# Patient Record
Sex: MALE | Race: BLACK OR AFRICAN AMERICAN | Employment: UNEMPLOYED | URBAN - METROPOLITAN AREA
[De-identification: names, ages, dates, MRNs, and addresses within clinical notes are randomized per-mention and may not be internally consistent; named-entity substitution may affect disease eponyms.]

---

## 2017-01-17 NOTE — TELEPHONE ENCOUNTER
I returned patient's call and confirmed refills are needed for spironolactone and estradiol.   ----- Message from Joie Piña sent at 1/17/2017  2:00 PM EST -----  Regarding: Patient medication  1/17/2017  2:01 PM    Adelso Britton! Just received a call from  Steven Yael [birth year]SE 1985 He said that he would like to talk to you about his prescription. Mr. Reji Chávez can be reached at [phone]. Thanks Marcel Garcia.

## 2017-01-18 RX ORDER — SPIRONOLACTONE 100 MG/1
100 TABLET, FILM COATED ORAL 2 TIMES DAILY
Qty: 60 TAB | Refills: 10 | Status: SHIPPED | OUTPATIENT
Start: 2017-01-18 | End: 2018-01-11 | Stop reason: SDUPTHER

## 2017-01-18 RX ORDER — ESTRADIOL 2 MG/1
2 TABLET ORAL 2 TIMES DAILY
Qty: 60 TAB | Refills: 10 | Status: SHIPPED | OUTPATIENT
Start: 2017-01-18 | End: 2017-12-19 | Stop reason: SDUPTHER

## 2017-05-15 ENCOUNTER — OFFICE VISIT (OUTPATIENT)
Dept: ENDOCRINOLOGY | Age: 32
End: 2017-05-15

## 2017-05-15 VITALS
WEIGHT: 274 LBS | HEIGHT: 66 IN | DIASTOLIC BLOOD PRESSURE: 75 MMHG | HEART RATE: 75 BPM | BODY MASS INDEX: 44.03 KG/M2 | SYSTOLIC BLOOD PRESSURE: 118 MMHG

## 2017-05-15 DIAGNOSIS — Z78.9 MALE-TO-FEMALE TRANSGENDER PERSON: Primary | ICD-10-CM

## 2017-05-15 NOTE — PROGRESS NOTES
History of Present Illness: Luis Taylor is a 27 y.o. transgender female (male to female) who presents for follow-up of transgender therapy. Initial visit with me was in 8/2015 and this was when hormone therapy was started. Current therapy:  Spironolactone 100mg mg BID (increased in 11/2015) and estradiol 2 mg BID (started 8/2015)     She continues to note changes:  - overall stable since last visit. Still noting the following:   Facial hair lightening up. Shaving less. Skin softer. Hair less curly. Has noted some shifting of body weight. Less from around middle and more towards hips/etc    Has been seeing psychiatrist for long time. Continues with quetiapine for mood/sleep, as well as clonazepam for same. BMI 43 -  Still avoiding sugared beverages. Just recently cut back on mayonaise. Started exercising more. Social  Lives with mother, uncle, two brothers. Mother is supportive of change      Past Medical History:   Diagnosis Date    Obesity      Current Outpatient Prescriptions   Medication Sig    estradiol (ESTRACE) 2 mg tablet Take 1 Tab by mouth two (2) times a day.  spironolactone (ALDACTONE) 100 mg tablet Take 1 Tab by mouth two (2) times a day.  clonazePAM (KLONOPIN) 0.5 mg tablet     QUEtiapine (SEROQUEL) 100 mg tablet     QUEtiapine (SEROQUEL) 50 mg tablet      No current facility-administered medications for this visit. No Known Allergies    Physical Examination:  Visit Vitals    /75    Pulse 75    Ht 5' 6\" (1.676 m)    Wt 274 lb (124.3 kg)    BMI 44.22 kg/m2   -   - General: pleasant, no distress, normal gait   HEENT: hearing intact, EOMI, clear sclera without icterus  - Cardiovascular: regular, normal rate   - Respiratory: normal effort  - Integumentary: no edema.  Skin does seem softer.   - Psychiatric: normal mood and affect    Data Reviewed:   Component      Latest Ref Rng & Units 10/3/2016 10/3/2016 10/3/2016           4:35 PM  4:35 PM  4:35 PM   Glucose 65 - 99 mg/dL 91     BUN      6 - 20 mg/dL 12     Creatinine      0.76 - 1.27 mg/dL 0.98     GFR est non-AA      >59 mL/min/1.73 102     GFR est AA      >59 mL/min/1.73 117     BUN/Creatinine ratio      8 - 19 12     Sodium      134 - 144 mmol/L 139     Potassium      3.5 - 5.2 mmol/L 4.2     Chloride      97 - 108 mmol/L 100     CO2      18 - 29 mmol/L 24     Calcium      8.7 - 10.2 mg/dL 9.1     Protein, total      6.0 - 8.5 g/dL 7.0     Albumin      3.5 - 5.5 g/dL 4.2     GLOBULIN, TOTAL      1.5 - 4.5 g/dL 2.8     A-G Ratio      1.1 - 2.5 1.5     Bilirubin, total      0.0 - 1.2 mg/dL 0.3     Alk. phosphatase      39 - 117 IU/L 30 (L)     AST      0 - 40 IU/L 20     ALT      0 - 44 IU/L 14     Testosterone      348 - 1197 ng/dL   18 (L)   Comment         Comment   Free testosterone (Direct)      8.7 - 25.1 pg/mL   2.1 (L)   Estradiol      7.6 - 42.6 pg/mL  152.9 (H)        Assessment/Plan:   1. 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 Of note, did not take AM dose of estradiol today. Last dose was likely around 1-2 am - about 12 hours ago. 2. Hormonal imbalance in transgender patient - as above   3. BMI 40.0-44.9, adult [facility]) - encouraged dietary efforts     Patient Instructions   Male to female therapy. Continue spironolactone  100 mg twice daily    Continue estradiol 2 mg twice daily. - reassess labs today        Follow-up Disposition:  Return in about 6 months (around 11/15/2017).     Copy sent to:

## 2017-05-15 NOTE — MR AVS SNAPSHOT
Visit Information Date & Time Provider Department Dept. Phone Encounter #  
 5/15/2017  3:10 PM Nneka Bernal, [address] [place] [address] Diabetes and Endocrinology [phone] Follow-up Instructions Return in about 6 months (around 11/15/2017). Upcoming Health Maintenance Date Due DTaP/Tdap/Td series (1 - Tdap) 7/11/2005 INFLUENZA AGE 9 TO ADULT 8/1/2017 Allergies as of 5/15/2017  Review Complete On: 5/15/2017 By: Nneka Bernal MD  
 No Known Allergies Current Immunizations  Never Reviewed No immunizations on file. Not reviewed this visit You Were Diagnosed With   
  
 Codes Comments Male-to-female transgender person    -  Primary ICD-10-CM: F64.0 ICD-9-CM: 302.85 Hormonal imbalance in transgender patient     ICD-10-CM: E34.9, F64.0 ICD-9-CM: 259.9, 302.85   
 BMI 40.0-44.9, adult (HCC)     ICD-10-CM: Z68.41 
ICD-9-CM: V85.41 Vitals BP Pulse Height(growth percentile) Weight(growth percentile) BMI Smoking Status 118/75 75 5' 6\" (1.676 m) 274 lb (124.3 kg) 44.22 kg/m2 Never Smoker Vitals History BMI and BSA Data Body Mass Index Body Surface Area  
 44.22 kg/m 2 2.41 m 2 Preferred Pharmacy Pharmacy Name Phone [facility] [address] [facility] [phone] Your Updated Medication List  
  
   
This list is accurate as of: 5/15/17  4:14 PM.  Always use your most recent med list.  
  
  
  
  
 clonazePAM 0.5 mg tablet Commonly known as:  KlonoPIN  
  
 estradiol 2 mg tablet Commonly known as:  ESTRACE Take 1 Tab by mouth two (2) times a day. * QUEtiapine 100 mg tablet Commonly known as:  SEROquel * QUEtiapine 50 mg tablet Commonly known as:  SEROquel  
  
 spironolactone 100 mg tablet Commonly known as:  ALDACTONE Take 1 Tab by mouth two (2) times a day. * Notice:   This list has 2 medication(s) that are the same as other medications prescribed for you. Read the directions carefully, and ask your doctor or other care provider to review them with you. We Performed the Following ESTRADIOL P1928644 CPT(R)] METABOLIC PANEL, BASIC [56849 CPT(R)] TESTOSTERONE, WOMEN/CHILD [id number] CPT(R)] Follow-up Instructions Return in about 6 months (around 11/15/2017). Patient Instructions Male to female therapy. Continue spironolactone  100 mg twice daily Continue estradiol 2 mg twice daily. - reassess labs today Introducing [facility] SERVICES! Peg Hemp introduces [employer] patient portal. Now you can access parts of your medical record, email your doctor's office, and request medication refills online. 1. In your internet browser, go to [email or web address] 2. Click on the First Time User? Click Here link in the Sign In box. You will see the New Member Sign Up page. 3. Enter your [employer] Access Code exactly as it appears below. You will not need to use this code after youve completed the sign-up process. If you do not sign up before the expiration date, you must request a new code. · [employer] Access Code: [email or web address] Expires: 8/13/2017  4:14 PM 
 
4. Enter the last four digits of your Social Security Number (xxxx) and Date of Birth (mm/dd/yyyy) as indicated and click Submit. You will be taken to the next sign-up page. 5. Create a TrustEgg ID. This will be your [employer]Egg login ID and cannot be changed, so think of one that is secure and easy to remember. 6. Create a [employer]Egg password. You can change your password at any time. 7. Enter your Password Reset Question and Answer. This can be used at a later time if you forget your password. 8. Enter your e-mail address. You will receive e-mail notification when new information is available in [address] 9. Click Sign Up. You can now view and download portions of your medical record. 10. Click the Download Summary menu link to download a portable copy of your medical information. If you have questions, please visit the Frequently Asked Questions section of the [employer] website. Remember, [employer] is NOT to be used for urgent needs. For medical emergencies, dial 911. Now available from your iPhone and Android! Please provide this summary of care documentation to your next provider. Your primary care clinician is listed as Eloy Rudd. If you have any questions after today's visit, please call [phone].

## 2017-05-15 NOTE — PATIENT INSTRUCTIONS
Male to female therapy. Continue spironolactone  100 mg twice daily    Continue estradiol 2 mg twice daily.      - reassess labs today

## 2017-05-20 LAB
BUN SERPL-MCNC: 11 MG/DL (ref 6–20)
BUN/CREAT SERPL: 13 (ref 9–20)
CALCIUM SERPL-MCNC: 9.3 MG/DL (ref 8.7–10.2)
CHLORIDE SERPL-SCNC: 101 MMOL/L (ref 96–106)
CO2 SERPL-SCNC: 24 MMOL/L (ref 18–29)
CREAT SERPL-MCNC: 0.82 MG/DL (ref 0.76–1.27)
ESTRADIOL SERPL-MCNC: 155.1 PG/ML (ref 7.6–42.6)
GLUCOSE SERPL-MCNC: 88 MG/DL (ref 65–99)
POTASSIUM SERPL-SCNC: 4.8 MMOL/L (ref 3.5–5.2)
SODIUM SERPL-SCNC: 140 MMOL/L (ref 134–144)
TESTOST SERPL-MCNC: 12 NG/DL

## 2017-05-21 NOTE — PROGRESS NOTES
Labs are very stable and consistent. Continue estradiol 2 mg twice daily and spironolactone. Karla Rosa,  Please call and mail lab letter. Thank you.

## 2017-12-18 ENCOUNTER — OFFICE VISIT (OUTPATIENT)
Dept: ENDOCRINOLOGY | Age: 32
End: 2017-12-18

## 2017-12-18 VITALS
BODY MASS INDEX: 42.59 KG/M2 | HEART RATE: 75 BPM | DIASTOLIC BLOOD PRESSURE: 70 MMHG | SYSTOLIC BLOOD PRESSURE: 127 MMHG | HEIGHT: 66 IN | WEIGHT: 265 LBS

## 2017-12-18 DIAGNOSIS — Z78.9 MALE-TO-FEMALE TRANSGENDER PERSON: Primary | ICD-10-CM

## 2017-12-18 PROBLEM — E66.01 OBESITY, MORBID (HCC): Status: ACTIVE | Noted: 2017-12-18

## 2017-12-18 NOTE — MR AVS SNAPSHOT
Visit Information Date & Time Provider Department Dept. Phone Encounter #  
 12/18/2017  2:10 PM Baldomero Barrow, [address] [facility] Diabetes and Endocrinology [phone] [id number] Follow-up Instructions Return in about 6 months (around 6/18/2018). Upcoming Health Maintenance Date Due DTaP/Tdap/Td series (1 - Tdap) 7/11/2005 Influenza Age 5 to Adult 8/1/2017 Allergies as of 12/18/2017  Review Complete On: 5/15/2017 By: Baldomero Barrow MD  
 No Known Allergies Current Immunizations  Never Reviewed No immunizations on file. Not reviewed this visit You Were Diagnosed With   
  
 Codes Comments Male-to-female transgender person    -  Primary ICD-10-CM: F64.0 ICD-9-CM: 302.85   
 BMI 40.0-44.9, adult (HCC)     ICD-10-CM: Z68.41 
ICD-9-CM: V85.41 Vitals BP Pulse Height(growth percentile) Weight(growth percentile) BMI Smoking Status 127/70 75 5' 6\" (1.676 m) 265 lb (120.2 kg) 42.77 kg/m2 Never Smoker Vitals History BMI and BSA Data Body Mass Index Body Surface Area 42.77 kg/m 2 2.37 m 2 Preferred Pharmacy Pharmacy Name Phone [facility] [address] [place] [facility] [facility] [phone] Your Updated Medication List  
  
   
This list is accurate as of: 12/18/17  2:58 PM.  Always use your most recent med list.  
  
  
  
  
 clonazePAM 0.5 mg tablet Commonly known as:  KlonoPIN  
  
 estradiol 2 mg tablet Commonly known as:  ESTRACE Take 1 Tab by mouth two (2) times a day. QUEtiapine 100 mg tablet Commonly known as:  SEROquel  
  
 spironolactone 100 mg tablet Commonly known as:  ALDACTONE Take 1 Tab by mouth two (2) times a day. We Performed the Following ESTRADIOL [id number] CPT(R)] METABOLIC PANEL, COMPREHENSIVE [52127 CPT(R)] PROLACTIN [76552 CPT(R)] TESTOSTERONE, FREE & TOTAL [52929 CPT(R)] Follow-up Instructions Return in about 6 months (around 6/18/2018). Patient Instructions Male to female therapy. Continue spironolactone  100 mg twice daily Continue estradiol 2 mg twice daily. - reassess labs today Introducing [facility] SERVICES! [facility] introduces [employer] patient portal. Now you can access parts of your medical record, email your doctor's office, and request medication refills online. 1. In your internet browser, go to [email or web address] 2. Click on the First Time User? Click Here link in the Sign In box. You will see the New Member Sign Up page. 3. Enter your [employer] Access Code exactly as it appears below. You will not need to use this code after youve completed the sign-up process. If you do not sign up before the expiration date, you must request a new code. · [employer] Access Code: [email or web address] Expires: 3/18/2018  2:58 PM 
 
4. Enter the last four digits of your Social Security Number (xxxx) and Date of Birth (mm/dd/yyyy) as indicated and click Submit. You will be taken to the next sign-up page. 5. Create a [employer]s ID. This will be your [employer] login ID and cannot be changed, so think of one that is secure and easy to remember. 6. Create a [employer] password. You can change your password at any time. 7. Enter your Password Reset Question and Answer. This can be used at a later time if you forget your password. 8. Enter your e-mail address. You will receive e-mail notification when new information is available in [address] 9. Click Sign Up. You can now view and download portions of your medical record. 10. Click the Download Summary menu link to download a portable copy of your medical information. If you have questions, please visit the Frequently Asked Questions section of the [employer] website. Remember, [employer] is NOT to be used for urgent needs. For medical emergencies, dial 911. Now available from your iPhone and Android! Please provide this summary of care documentation to your next provider. Your primary care clinician is listed as Janay Bernabe. If you have any questions after today's visit, please call [phone].

## 2017-12-18 NOTE — PROGRESS NOTES
History of Present Illness: Allen Cox is a 34 y.o. transgender female presents for follow-up of transgender therapy. Initial visit with me was in 8/2015 and this was when hormone therapy was started. Still has not fully transitioned socially. Current therapy:  Spironolactone 100mg mg BID (increased in 11/2015) and estradiol 2 mg BID (started 8/2015)     She continues to note changes gradually. Facial hair growing more slowly. Shaving less. Skin softer. Has noted the redistribution of body weight    Has been seeing psychiatrist for long time. Continues with quetiapine for mood/sleep, as well as clonazepam for same. Quetiapine dose decreased from 150 mg to 100 mg since last visit. BMI 43 -  Weight down 9 lbs . Exercising still. Paying more attention to ingredients and nutrition labels  Having less candy. Social  Lives with mother, uncle, two brothers. Mother is supportive of change  Plans to change name to Astrid. Past Medical History:   Diagnosis Date    Obesity      Current Outpatient Prescriptions   Medication Sig    estradiol (ESTRACE) 2 mg tablet Take 1 Tab by mouth two (2) times a day.  spironolactone (ALDACTONE) 100 mg tablet Take 1 Tab by mouth two (2) times a day.  clonazePAM (KLONOPIN) 0.5 mg tablet     QUEtiapine (SEROQUEL) 100 mg tablet     QUEtiapine (SEROQUEL) 50 mg tablet      No current facility-administered medications for this visit.       No Known Allergies    Physical Examination:  Visit Vitals    /70    Pulse 75    Ht 5' 6\" (1.676 m)    Wt 265 lb (120.2 kg)    BMI 42.77 kg/m2   -   - General: pleasant, no distress, normal gait   HEENT: hearing intact, EOMI, clear sclera without icterus  - Cardiovascular: regular, normal rate   - Respiratory: normal effort  - Integumentary: no edema  - Psychiatric: normal mood and affect    Data Reviewed:   Component      Latest Ref Rng & Units 5/15/2017 5/15/2017 10/3/2016 10/3/2016           4:37 PM  4:37 PM  4:35 PM  4:35 PM   Glucose      65 - 99 mg/dL   91    BUN      6 - 20 mg/dL   12    Creatinine      0.76 - 1.27 mg/dL   0.98    GFR est non-AA      >59 mL/min/1.73   102    GFR est AA      >59 mL/min/1.73   117    BUN/Creatinine ratio      8 - 19   12    Sodium      134 - 144 mmol/L   139    Potassium      3.5 - 5.2 mmol/L   4.2    Chloride      97 - 108 mmol/L   100    CO2      18 - 29 mmol/L   24    Calcium      8.7 - 10.2 mg/dL   9.1    Protein, total      6.0 - 8.5 g/dL   7.0    Albumin      3.5 - 5.5 g/dL   4.2    GLOBULIN, TOTAL      1.5 - 4.5 g/dL   2.8    A-G Ratio      1.1 - 2.5   1.5    Bilirubin, total      0.0 - 1.2 mg/dL   0.3    Alk. phosphatase      39 - 117 IU/L   30 (L)    AST      0 - 40 IU/L   20    ALT (SGPT)      0 - 44 IU/L   14    Testosterone      ng/dL  12 (L)     Comment             Free testosterone (Direct)      8.7 - 25.1 pg/mL       Estradiol      7.6 - 42.6 pg/mL 155.1 (H)   152.9 (H)     Component      Latest Ref Rng & Units 10/3/2016           4:35 PM   Glucose      65 - 99 mg/dL    BUN      6 - 20 mg/dL    Creatinine      0.76 - 1.27 mg/dL    GFR est non-AA      >59 mL/min/1.73    GFR est AA      >59 mL/min/1.73    BUN/Creatinine ratio      8 - 19    Sodium      134 - 144 mmol/L    Potassium      3.5 - 5.2 mmol/L    Chloride      97 - 108 mmol/L    CO2      18 - 29 mmol/L    Calcium      8.7 - 10.2 mg/dL    Protein, total      6.0 - 8.5 g/dL    Albumin      3.5 - 5.5 g/dL    GLOBULIN, TOTAL      1.5 - 4.5 g/dL    A-G Ratio      1.1 - 2.5    Bilirubin, total      0.0 - 1.2 mg/dL    Alk. phosphatase      39 - 117 IU/L    AST      0 - 40 IU/L    ALT (SGPT)      0 - 44 IU/L    Testosterone      ng/dL 18 (L)   Comment       Comment   Free testosterone (Direct)      8.7 - 25.1 pg/mL 2.1 (L)   Estradiol      7.6 - 42.6 pg/mL        Assessment/Plan:   1.  Male-to-female transgender person   - will assess electrolytes, testosterone, estradiol and prolactin  - continue current therapy unless labs suggest need for change. 2. BMI 40.0-44.9, adult ([facility] [address]   - encouraged continued efforts with weight loss and exercise. Patient Instructions   Male to female therapy. Continue spironolactone  100 mg twice daily    Continue estradiol 2 mg twice daily. - reassess labs today        Follow-up Disposition:  Return in about 6 months (around 6/18/2018).     Copy sent to:

## 2017-12-19 LAB
ALBUMIN SERPL-MCNC: 3.8 G/DL (ref 3.5–5.5)
ALBUMIN/GLOB SERPL: 1.4 {RATIO} (ref 1.2–2.2)
ALP SERPL-CCNC: 31 IU/L (ref 39–117)
ALT SERPL-CCNC: 9 IU/L (ref 0–44)
AST SERPL-CCNC: 16 IU/L (ref 0–40)
BILIRUB SERPL-MCNC: 0.3 MG/DL (ref 0–1.2)
BUN SERPL-MCNC: 12 MG/DL (ref 6–20)
BUN/CREAT SERPL: 12 (ref 9–20)
CALCIUM SERPL-MCNC: 9 MG/DL (ref 8.7–10.2)
CHLORIDE SERPL-SCNC: 100 MMOL/L (ref 96–106)
CO2 SERPL-SCNC: 25 MMOL/L (ref 18–29)
CREAT SERPL-MCNC: 0.97 MG/DL (ref 0.76–1.27)
ESTRADIOL SERPL-MCNC: 167.8 PG/ML (ref 7.6–42.6)
GLOBULIN SER CALC-MCNC: 2.8 G/DL (ref 1.5–4.5)
GLUCOSE SERPL-MCNC: 90 MG/DL (ref 65–99)
POTASSIUM SERPL-SCNC: 4.6 MMOL/L (ref 3.5–5.2)
PROLACTIN SERPL-MCNC: 12.5 NG/ML (ref 4–15.2)
PROT SERPL-MCNC: 6.6 G/DL (ref 6–8.5)
SODIUM SERPL-SCNC: 139 MMOL/L (ref 134–144)
TESTOST FREE SERPL-MCNC: 2.2 PG/ML (ref 8.7–25.1)
TESTOST SERPL-MCNC: 26 NG/DL (ref 264–916)

## 2017-12-19 RX ORDER — ESTRADIOL 2 MG/1
TABLET ORAL
Qty: 60 TAB | Refills: 10 | Status: SHIPPED | OUTPATIENT
Start: 2017-12-19 | End: 2018-11-06 | Stop reason: SDUPTHER

## 2018-01-11 RX ORDER — SPIRONOLACTONE 100 MG/1
TABLET, FILM COATED ORAL
Qty: 60 TAB | Refills: 10 | Status: SHIPPED | OUTPATIENT
Start: 2018-01-11 | End: 2018-12-26 | Stop reason: SDUPTHER

## 2018-05-29 LAB
CREATININE, EXTERNAL: 0.95
HBA1C MFR BLD HPLC: 5.3 %
LDL-C, EXTERNAL: 82

## 2018-06-18 ENCOUNTER — OFFICE VISIT (OUTPATIENT)
Dept: ENDOCRINOLOGY | Age: 33
End: 2018-06-18

## 2018-06-18 VITALS
BODY MASS INDEX: 42.59 KG/M2 | HEART RATE: 78 BPM | DIASTOLIC BLOOD PRESSURE: 74 MMHG | HEIGHT: 66 IN | SYSTOLIC BLOOD PRESSURE: 126 MMHG | WEIGHT: 265 LBS

## 2018-06-18 DIAGNOSIS — Z78.9 MALE-TO-FEMALE TRANSGENDER PERSON: Primary | ICD-10-CM

## 2018-06-18 RX ORDER — LORATADINE 10 MG/1
10 TABLET ORAL
COMMUNITY
End: 2021-01-25 | Stop reason: ALTCHOICE

## 2018-06-18 NOTE — MR AVS SNAPSHOT
[address] [facility] 
[phone] Patient: Georgina Jaquez MRN: [id number] :1985 Visit Information Date & Time Provider Department Dept. Phone Encounter #  
 2018  2:30 PM Marleen Goodman, [address] [place] [address] Diabetes and Endocrinology [phone] Follow-up Instructions Return in about 6 months (around 2018). Upcoming Health Maintenance Date Due DTaP/Tdap/Td series (1 - Tdap) 2005 MEDICARE YEARLY EXAM 3/14/2018 Influenza Age 5 to Adult 2018 Allergies as of 2018  Review Complete On: 2018 By: Marleen Goodman MD  
 No Known Allergies Current Immunizations  Never Reviewed No immunizations on file. Not reviewed this visit You Were Diagnosed With   
  
 Codes Comments Male-to-female transgender person    -  Primary ICD-10-CM: F64.0 ICD-9-CM: 302.85   
 BMI 40.0-44.9, adult (HCC)     ICD-10-CM: Z68.41 
ICD-9-CM: V85.41 Vitals BP Pulse Height(growth percentile) Weight(growth percentile) BMI Smoking Status 126/74 78 5' 6\" (1.676 m) 265 lb (120.2 kg) 42.77 kg/m2 Never Smoker Vitals History BMI and BSA Data Body Mass Index Body Surface Area 42.77 kg/m 2 2.37 m 2 Preferred Pharmacy Pharmacy Name Phone [facility] [phone] Your Updated Medication List  
  
   
This list is accurate as of 18  3:12 PM.  Always use your most recent med list.  
  
  
  
  
 CLARITIN 10 mg tablet Generic drug:  loratadine Take 10 mg by mouth.  
  
 clonazePAM 0.5 mg tablet Commonly known as:  KlonoPIN  
  
 estradiol 2 mg tablet Commonly known as:  ESTRACE  
take 1 tablet by mouth twice a day QUEtiapine 100 mg tablet Commonly known as:  SEROquel  
  
 spironolactone 100 mg tablet Commonly known as:  ALDACTONE  
 take 1 tablet by mouth twice a day We Performed the Following ESTRADIOL [id number] CPT(R)] TESTOSTERONE, FREE & TOTAL [53061 CPT(R)] Follow-up Instructions Return in about 6 months (around 12/18/2018). Patient Instructions Male to female therapy. Continue spironolactone  100 mg twice daily Continue estradiol 2 mg twice daily. - reassess labs today Introducing [facility] & [facility] SERVICES! Trixie Mckinney introduces [employer] patient portal. Now you can access parts of your medical record, email your doctor's office, and request medication refills online. 1. In your internet browser, go to [email or web address] 2. Click on the First Time User? Click Here link in the Sign In box. You will see the New Member Sign Up page. 3. Enter your [employer] Access Code exactly as it appears below. You will not need to use this code after youve completed the sign-up process. If you do not sign up before the expiration date, you must request a new code. · [employer] Access Code: [email or web address] Expires: 9/16/2018  3:12 PM 
 
4. Enter the last four digits of your Social Security Number (xxxx) and Date of Birth (mm/dd/yyyy) as indicated and click Submit. You will be taken to the next sign-up page. 5. Create a [employer] ID. This will be your [employer] login ID and cannot be changed, so think of one that is secure and easy to remember. 6. Create a [employer] password. You can change your password at any time. 7. Enter your Password Reset Question and Answer. This can be used at a later time if you forget your password. 8. Enter your e-mail address. You will receive e-mail notification when new information is available in [address] 9. Click Sign Up. You can now view and download portions of your medical record. 10. Click the Download Summary menu link to download a portable copy of your medical information. If you have questions, please visit the Frequently Asked Questions section of the [employer] website. Remember, [employer] is NOT to be used for urgent needs. For medical emergencies, dial 911. Now available from your iPhone and Android! Please provide this summary of care documentation to your next provider. Your primary care clinician is listed as Olvin Ventura. If you have any questions after today's visit, please call [phone].

## 2018-06-18 NOTE — PROGRESS NOTES
History of Present Illness: Huan Win is a 34 y.o. male presents for follow-up of transgender therapy  Initial visit with me was in 8/2015 and this was when hormone therapy was started. Still has not fully transitioned socially. Current therapy:  Spironolactone 100mg mg BID (increased in 11/2015) and estradiol 2 mg BID (started 8/2015)     She continues to note changes gradually. Continues to see subtle improvements. Facial hair growing more slowly still. Still shaves, but doing less often and hair is less noticeable. Skin softer. Has noted the redistribution of body weight, breast enlargement. Has been seeing psychiatrist for long time. Continues with quetiapine for mood/sleep, as well as clonazepam for same. BMI 43 -  Weight loss has plateau. Noted she was having some sweet tea. Will cut this out. Social  Lives with mother, uncle, two brothers. Has 2 cats in house       Past Medical History:   Diagnosis Date    Male-to-female transgender person     Obesity      Current Outpatient Prescriptions   Medication Sig    loratadine (CLARITIN) 10 mg tablet Take 10 mg by mouth.  spironolactone (ALDACTONE) 100 mg tablet take 1 tablet by mouth twice a day    estradiol (ESTRACE) 2 mg tablet take 1 tablet by mouth twice a day    clonazePAM (KLONOPIN) 0.5 mg tablet     QUEtiapine (SEROQUEL) 100 mg tablet      No current facility-administered medications for this visit.       No Known Allergies      Physical Examination:  Visit Vitals    /74    Pulse 78    Ht 5' 6\" (1.676 m)    Wt 265 lb (120.2 kg)    BMI 42.77 kg/m2   -   - General: pleasant, no distress, normal gait   HEENT: hearing intact, EOMI, clear sclera without icterus  - Cardiovascular: regular, normal rate   - Respiratory: normal effort  - Integumentary: no edema  - Psychiatric: normal mood and affect    Data Reviewed:     Component      Latest Ref Rng & Units 12/18/2017 12/18/2017 12/18/2017 12/18/2017           3:31 PM 3:31 PM  3:31 PM  3:31 PM   Glucose      65 - 99 mg/dL    90   BUN      6 - 20 mg/dL    12   Creatinine      0.76 - 1.27 mg/dL    0.97   GFR est non-AA      >59 mL/min/1.73    102   GFR est AA      >59 mL/min/1.73    118   BUN/Creatinine ratio      9 - 20    12   Sodium      134 - 144 mmol/L    139   Potassium      3.5 - 5.2 mmol/L    4.6   Chloride      96 - 106 mmol/L    100   CO2      18 - 29 mmol/L    25   Calcium      8.7 - 10.2 mg/dL    9.0   Protein, total      6.0 - 8.5 g/dL    6.6   Albumin      3.5 - 5.5 g/dL    3.8   GLOBULIN, TOTAL      1.5 - 4.5 g/dL    2.8   A-G Ratio      1.2 - 2.2    1.4   Bilirubin, total      0.0 - 1.2 mg/dL    0.3   Alk. phosphatase      39 - 117 IU/L    31 (L)   AST      0 - 40 IU/L    16   ALT (SGPT)      0 - 44 IU/L    9   Testosterone      264 - 916 ng/dL   26 (L)    Free testosterone (Direct)      8.7 - 25.1 pg/mL   2.2 (L)    Estradiol      7.6 - 42.6 pg/mL  167.8 (H)     Prolactin      4.0 - 15.2 ng/mL 12.5        Component      Latest Ref Rng & Units 5/15/2017 5/15/2017           4:37 PM  4:37 PM   Glucose      65 - 99 mg/dL     BUN      6 - 20 mg/dL     Creatinine      0.76 - 1.27 mg/dL     GFR est non-AA      >59 mL/min/1.73     GFR est AA      >59 mL/min/1.73     BUN/Creatinine ratio      9 - 20     Sodium      134 - 144 mmol/L     Potassium      3.5 - 5.2 mmol/L     Chloride      96 - 106 mmol/L     CO2      18 - 29 mmol/L     Calcium      8.7 - 10.2 mg/dL     Protein, total      6.0 - 8.5 g/dL     Albumin      3.5 - 5.5 g/dL     GLOBULIN, TOTAL      1.5 - 4.5 g/dL     A-G Ratio      1.2 - 2.2     Bilirubin, total      0.0 - 1.2 mg/dL     Alk. phosphatase      39 - 117 IU/L     AST      0 - 40 IU/L     ALT (SGPT)      0 - 44 IU/L     Testosterone      264 - 916 ng/dL  12 (L)   Free testosterone (Direct)      8.7 - 25.1 pg/mL     Estradiol      7.6 - 42.6 pg/mL 155.1 (H)    Prolactin      4.0 - 15.2 ng/mL       Assessment/Plan:   1.  Male-to-female transgender person   - labs again today. As it has been 3 years on same regimen, can decrease monitoring interval to yearly. Continue estradiol 2 mg twice daily and spironolactone twice daily. 2. BMI 40.0-44.9, adult ([facility] [address]   - agreed with efforts to avoid liquid calories. Patient Instructions   Male to female therapy. Continue spironolactone  100 mg twice daily    Continue estradiol 2 mg twice daily. - reassess labs today        Follow-up Disposition:  Return in about 6 months (around 12/18/2018).     Copy sent to:

## 2018-06-20 LAB
ESTRADIOL SERPL-MCNC: 154.2 PG/ML (ref 7.6–42.6)
TESTOST FREE SERPL-MCNC: 1.7 PG/ML (ref 8.7–25.1)
TESTOST SERPL-MCNC: 22 NG/DL (ref 264–916)

## 2018-11-06 RX ORDER — ESTRADIOL 2 MG/1
TABLET ORAL
Qty: 60 TAB | Refills: 10 | Status: SHIPPED | OUTPATIENT
Start: 2018-11-06 | End: 2019-10-14 | Stop reason: SDUPTHER

## 2018-12-26 RX ORDER — SPIRONOLACTONE 100 MG/1
TABLET, FILM COATED ORAL
Qty: 60 TAB | Refills: 10 | Status: SHIPPED | OUTPATIENT
Start: 2018-12-26 | End: 2019-11-27 | Stop reason: SDUPTHER

## 2019-06-18 ENCOUNTER — OFFICE VISIT (OUTPATIENT)
Dept: ENDOCRINOLOGY | Age: 34
End: 2019-06-18

## 2019-06-18 VITALS
WEIGHT: 283 LBS | DIASTOLIC BLOOD PRESSURE: 69 MMHG | HEIGHT: 66 IN | SYSTOLIC BLOOD PRESSURE: 111 MMHG | HEART RATE: 61 BPM | BODY MASS INDEX: 45.48 KG/M2

## 2019-06-18 DIAGNOSIS — G47.26 SHIFTING SLEEP-WORK SCHEDULE: ICD-10-CM

## 2019-06-18 DIAGNOSIS — Z78.9 MALE-TO-FEMALE TRANSGENDER PERSON: Primary | ICD-10-CM

## 2019-06-18 NOTE — PATIENT INSTRUCTIONS
Hormone treatment:  Levels were at goal last summer  Will reassess. Continue spironolactone  100 mg twice daily    Continue estradiol 2 mg twice daily.

## 2019-06-19 NOTE — PROGRESS NOTES
History of Present Illness: Sharona Sierra is a 28 y.o. transgender woman presents for follow-up of transgender treatment  Initial visit with me was in 8/2015 and this was when hormone therapy was started. Still has not fully transitioned socially. Current therapy:  Spironolactone 100mg mg BID (increased in 11/2015) and estradiol 2 mg BID (started 8/2015)   Is having stable and levels were at goal 1 year ago, that she returns for one-year follow-up visit. We discussed her weight gain since last visit. She does notice a change in her body weight distribution. Reports she has lost weight around her abdomen and noted that her thighs have increased in size. She attributed this increase in muscles in size of her thighs due to muscle mass increase from being active and doing squats    s. Facial hair growing more slowly still. Still shaves, but doing less often and hair is less noticeable. Skin softer. Has been seeing psychiatrist for long time. Continues with quetiapine for mood/sleep, as well as clonazepam for same. BMI 45  Social  Lives with mother, uncle, two brothers. Has 2 cats in house     Currently has a schedule where she works nights at [employer]. Reports preference for nights due to Seroquel and clonazepam making her very sleepy and waking up early in the morning has always been a very big challenge for    Past Medical History:   Diagnosis Date    Male-to-female transgender person     Obesity      Current Outpatient Medications   Medication Sig    spironolactone (ALDACTONE) 100 mg tablet take 1 tablet by mouth twice a day    estradiol (ESTRACE) 2 mg tablet take 1 tablet by mouth twice a day    loratadine (CLARITIN) 10 mg tablet Take 10 mg by mouth.  clonazePAM (KLONOPIN) 0.5 mg tablet     QUEtiapine (SEROQUEL) 100 mg tablet      No current facility-administered medications for this visit.       No Known Allergies    Physical Examination:  Visit Vitals  /69 (BP 1 Location: Left arm, BP Patient Position: Sitting)   Pulse 61   Ht 5' 6\" (1.676 m)   Wt 283 lb (128.4 kg)   BMI 45.68 kg/m²   -   - General: pleasant, no distress, normal gait   HEENT: hearing intact, EOMI, clear sclera without icterus  - Cardiovascular: regular, normal rate   - Respiratory: normal effort  - Integumentary: no edema  - Psychiatric: normal mood and affect    Data Reviewed:   Lab Results   Component Value Date/Time    Hemoglobin A1c, External 5.3 05/29/2018      Lab Results   Component Value Date/Time    Creatinine, External 0.95 05/29/2018        Lab Results   Component Value Date/Time    LDL-C, External 82 05/29/2018      No results found for: TSH, FT4, TRALT, TMCLT, TSHEXT     Assessment/Plan:   1. Male-to-female transgender person   Overall, clinically doing well  Medications have been quite stable over the last 2 to 3 years  Reassess estradiol and testosterone   2. BMI 45.0-49.9, adult [facility])   She was surprised by weight increase. See HPI  Encouraged smaller portions. Suspect the higher estrogen and lower testosterone levels have led to body weight distribution changes reflected in the decreased white size and increased hip and thigh size   3. Shifting sleep-work schedule   Reviewed how sleep schedule and food intake schedule is important for health, including diabetes risk and obesity risk. If possible, would recommend changing to a daytime schedule, but unfortunately she feels she does much better with a nighttime schedule. This is probably due to the current meds she is taking which make her very sleepy at bedtime. Recommend she consider discussing this with her psychiatrist     Patient Instructions   Hormone treatment:  Levels were at goal last summer  Will reassess. Continue spironolactone  100 mg twice daily    Continue estradiol 2 mg twice daily. Follow-up and Dispositions    · Return in about 1 year (around 6/18/2020).            Copy sent to:

## 2019-06-22 LAB
ALBUMIN SERPL-MCNC: 4.1 G/DL (ref 3.5–5.5)
ALBUMIN/GLOB SERPL: 1.6 {RATIO} (ref 1.2–2.2)
ALP SERPL-CCNC: 30 IU/L (ref 39–117)
ALT SERPL-CCNC: 8 IU/L (ref 0–44)
AST SERPL-CCNC: 14 IU/L (ref 0–40)
BILIRUB SERPL-MCNC: 0.2 MG/DL (ref 0–1.2)
BUN SERPL-MCNC: 10 MG/DL (ref 6–20)
BUN/CREAT SERPL: 11 (ref 9–20)
CALCIUM SERPL-MCNC: 8.9 MG/DL (ref 8.7–10.2)
CHLORIDE SERPL-SCNC: 103 MMOL/L (ref 96–106)
CO2 SERPL-SCNC: 23 MMOL/L (ref 20–29)
CREAT SERPL-MCNC: 0.89 MG/DL (ref 0.76–1.27)
ESTRADIOL SERPL-MCNC: 265 PG/ML (ref 7.6–42.6)
GLOBULIN SER CALC-MCNC: 2.6 G/DL (ref 1.5–4.5)
GLUCOSE SERPL-MCNC: 98 MG/DL (ref 65–99)
POTASSIUM SERPL-SCNC: 4.4 MMOL/L (ref 3.5–5.2)
PROT SERPL-MCNC: 6.7 G/DL (ref 6–8.5)
SODIUM SERPL-SCNC: 139 MMOL/L (ref 134–144)
TESTOST SERPL-MCNC: 11 NG/DL

## 2019-06-23 NOTE — PROGRESS NOTES
Estradiol is at goal  Testosterone is higher than goal.    Estradiol dosing has previously resulted in values at goal.  [facility] if patient is now taking this sublingually?   If so would recommend resumption of standard oral dosing

## 2019-06-24 ENCOUNTER — TELEPHONE (OUTPATIENT)
Dept: ENDOCRINOLOGY | Age: 34
End: 2019-06-24

## 2019-06-24 NOTE — TELEPHONE ENCOUNTER
----- Message from Michel Bueno sent at 6/24/2019  2:39 PM EDT -----  Regarding: /Telephone  Pt called requesting a call back from a missed call . Pt best contact number is [phone] .

## 2019-06-24 NOTE — TELEPHONE ENCOUNTER
6/24/2019  2:44 PM        Please see message from answering service regarding a missed call.           Thanks

## 2019-06-24 NOTE — TELEPHONE ENCOUNTER
----- Message from Arlene Davidson MD sent at 6/23/2019  7:50 PM EDT -----  Estradiol is at goal  Testosterone is higher than goal.    Estradiol dosing has previously resulted in values at goal.  [facility] if patient is now taking this sublingually?   If so would recommend resumption of standard oral dosing

## 2019-06-25 ENCOUNTER — TELEPHONE (OUTPATIENT)
Dept: ENDOCRINOLOGY | Age: 34
End: 2019-06-25

## 2019-06-25 NOTE — TELEPHONE ENCOUNTER
6/25/2019  2:33 PM          Please see message below from answering service regarding a return call.             Thanks

## 2019-06-25 NOTE — TELEPHONE ENCOUNTER
----- Message from Thai Solomon sent at 2019  2:21 PM EDT -----  Regardin Elisha: [phone]  Patient is returning a call back to the practice, patient's best contact #[phone]

## 2019-06-26 ENCOUNTER — TELEPHONE (OUTPATIENT)
Dept: ENDOCRINOLOGY | Age: 34
End: 2019-06-26

## 2019-06-26 NOTE — TELEPHONE ENCOUNTER
----- Message from Tad Ramirez sent at 6/26/2019  3:09 PM EDT -----  Regarding: Dr. Lewis Howard  Pt request for a call back from the practice. He missed a previous call from the office. His best time availibilty is between 2:30 and 3pm. Best contact number is [phone].

## 2019-06-26 NOTE — TELEPHONE ENCOUNTER
Called and left a message that I mailed out his lab results and if he has any questions once he relieves the results, to contact the office.

## 2019-10-16 RX ORDER — ESTRADIOL 2 MG/1
TABLET ORAL
Qty: 60 TAB | Refills: 10 | Status: SHIPPED | OUTPATIENT
Start: 2019-10-16 | End: 2020-04-20

## 2020-04-20 RX ORDER — ESTRADIOL 2 MG/1
TABLET ORAL
Qty: 60 TAB | Refills: 5 | Status: SHIPPED | OUTPATIENT
Start: 2020-04-20

## 2021-01-25 ENCOUNTER — HOSPITAL ENCOUNTER (EMERGENCY)
Age: 36
Discharge: HOME | End: 2021-01-25
Attending: EMERGENCY MEDICINE
Payer: MEDICARE

## 2021-01-25 VITALS
WEIGHT: 270 LBS | BODY MASS INDEX: 47.84 KG/M2 | HEIGHT: 63 IN | TEMPERATURE: 98.7 F | OXYGEN SATURATION: 97 % | SYSTOLIC BLOOD PRESSURE: 137 MMHG | DIASTOLIC BLOOD PRESSURE: 73 MMHG | HEART RATE: 93 BPM | RESPIRATION RATE: 16 BRPM

## 2021-01-25 DIAGNOSIS — J06.9 ACUTE URI: Primary | ICD-10-CM

## 2021-01-25 PROCEDURE — 99282 EMERGENCY DEPT VISIT SF MDM: CPT

## 2021-01-25 RX ORDER — GUAIFENESIN 600 MG/1
600 TABLET, EXTENDED RELEASE ORAL 2 TIMES DAILY
Qty: 14 TAB | Refills: 0 | Status: SHIPPED | OUTPATIENT
Start: 2021-01-25

## 2021-01-25 RX ORDER — AMOXICILLIN 875 MG/1
875 TABLET, FILM COATED ORAL 2 TIMES DAILY
Qty: 14 TAB | Refills: 0 | Status: SHIPPED | OUTPATIENT
Start: 2021-01-25 | End: 2021-02-01

## 2021-01-25 RX ORDER — OXYMETAZOLINE HCL 0.05 %
2 SPRAY, NON-AEROSOL (ML) NASAL 2 TIMES DAILY
Qty: 1 EACH | Refills: 0 | Status: SHIPPED | OUTPATIENT
Start: 2021-01-25 | End: 2021-01-28

## 2021-01-25 RX ORDER — LORATADINE AND PSEUDOEPHEDRINE SULFATE 5; 120 MG/1; MG/1
1 TABLET, EXTENDED RELEASE ORAL 2 TIMES DAILY
Qty: 14 TAB | Refills: 0 | Status: SHIPPED | OUTPATIENT
Start: 2021-01-25

## 2021-01-25 NOTE — ED NOTES
Pt arrives in the ED with complaints of cough, generalized body aches, and congestion x 1 week. Pt denies fevers but reports chills.

## 2021-01-25 NOTE — ED NOTES
Emergency Department Nursing Plan of Care       The Nursing Plan of Care is developed from the Nursing assessment and Emergency Department Attending provider initial evaluation. The plan of care may be reviewed in the ED Provider note.     The Plan of Care was developed with the following considerations:   Patient / Family readiness to learn indicated by:verbalized understanding  Persons(s) to be included in education: patient  Barriers to Learning/Limitations:No    [address]     1/25/2021   2:56 PM

## 2021-01-25 NOTE — Clinical Note
[facility] EMERGENCY DEPT 
[address] [facility] [address] 
[phone] Work/School Note Date: 1/25/2021 To Whom It May concern: 
 
Oscar Saeed was seen and treated today in the emergency room by the following provider(s): 
Attending Provider: Wesly Martinez MD 
Nurse Practitioner: Declan Forbes NP. Oscar Saeed is excused from work/school on 01/25/21 and 01/26/21. He is medically clear to return to work/school on 1/27/2021. Sincerely, Eb Ponce NP

## 2021-01-25 NOTE — ED PROVIDER NOTES
EMERGENCY DEPARTMENT HISTORY AND PHYSICAL EXAM    Date: 1/25/2021  Patient Name: Raven Daily    History of Presenting Illness     Chief Complaint   Patient presents with    Concern For COVID-19 (Coronavirus)         History Provided By: Patient    HPI: Raven Daily is a 38 y.o. male with a PMH of No significant past medical history who presents with concern for coronavirus. Patient reports flulike symptoms for 1 week. Reports body aches, congestion and nonproductive cough. Patient reports a lot of drainage running from the ears to the throat. Denies fever or chills. No reports of loss of smell or taste. Patient has not tried anything for symptoms. Patient reports working at a [employer] and wears mask along with shield consistently. Hx of seasonal allergies with weather change  PCP: Blanca Monk NP    Current Outpatient Medications   Medication Sig Dispense Refill    oxymetazoline (Afrin, oxymetazoline,) 0.05 % nasal spray 2 Sprays by Both Nostrils route two (2) times a day for 3 days. 1 Each 0    loratadine-pseudoephedrine (Claritin-D 12 Hour) 5-120 mg per tablet Take 1 Tab by mouth two (2) times a day. 14 Tab 0    amoxicillin (AMOXIL) 875 mg tablet Take 1 Tab by mouth two (2) times a day for 7 days. 14 Tab 0    guaiFENesin ER (MUCINEX) 600 mg ER tablet Take 1 Tab by mouth two (2) times a day. 14 Tab 0    estradioL (ESTRACE) 2 mg tablet Take 1 tab twice a day--MUST ESTABLISH WITH NEW ENDOCRINOLOGIST OR CONTACT PCP FOR FURTHER REFILLS 60 Tab 5    spironolactone (ALDACTONE) 100 mg tablet take 1 tablet by mouth twice a day 60 Tab 10    clonazePAM (KLONOPIN) 0.5 mg tablet   0    QUEtiapine (SEROQUEL) 100 mg tablet   0       Past History     Past Medical History:  Past Medical History:   Diagnosis Date    Male-to-female transgender person     Obesity        Past Surgical History:  History reviewed. No pertinent surgical history.     Family History:  Family History   Problem Relation Age of Onset    Lupus Mother     Hypertension Maternal Grandmother        Social History:  Social History     Tobacco Use    Smoking status: Never Smoker    Smokeless tobacco: Never Used   Substance Use Topics    Alcohol use: No    Drug use: No       Allergies:  No Known Allergies      Review of Systems   Review of Systems   Constitutional: Negative for appetite change, chills and fever. HENT: Positive for congestion, postnasal drip, rhinorrhea and sore throat. Negative for ear pain, sinus pressure, sneezing, trouble swallowing and voice change. Respiratory: Positive for cough. Negative for shortness of breath and wheezing. Cardiovascular: Negative for chest pain. Gastrointestinal: Negative for abdominal pain, constipation, diarrhea, nausea and vomiting. Genitourinary: Negative for dysuria, frequency and urgency. Musculoskeletal: Positive for arthralgias. Negative for myalgias. Skin: Negative for rash and wound. Neurological: Negative for dizziness, numbness and headaches. All other systems reviewed and are negative. Physical Exam     Vitals:    01/25/21 1419   BP: 137/73   Pulse: 93   Resp: 16   Temp: 98.7 °F (37.1 °C)   SpO2: 97%   Weight: 122.5 kg (270 lb)   Height: 5' 3\" (1.6 m)     Physical Exam  Vitals signs and nursing note reviewed. Constitutional:       General: He is not in acute distress. Appearance: He is well-developed. He is not ill-appearing. HENT:      Head: Normocephalic and atraumatic. Comments: L TM with scarring     Right Ear: Tympanic membrane, ear canal and external ear normal.      Left Ear: Ear canal and external ear normal.      Nose: Congestion and rhinorrhea present. Mouth/Throat:      Mouth: Mucous membranes are moist.      Pharynx: Oropharynx is clear. Posterior oropharyngeal erythema present. No oropharyngeal exudate. Eyes:      Extraocular Movements: Extraocular movements intact.       Conjunctiva/sclera: Conjunctivae normal. Pupils: Pupils are equal, round, and reactive to light. Neck:      Musculoskeletal: Normal range of motion and neck supple. Cardiovascular:      Rate and Rhythm: Normal rate and regular rhythm. Pulses: Normal pulses. Heart sounds: Normal heart sounds. Pulmonary:      Effort: Pulmonary effort is normal.      Breath sounds: Normal breath sounds. Abdominal:      General: Bowel sounds are normal. There is no distension. Palpations: Abdomen is soft. Tenderness: There is no abdominal tenderness. There is no guarding. Musculoskeletal: Normal range of motion. Lymphadenopathy:      Cervical: Cervical adenopathy present. Skin:     General: Skin is warm and dry. Neurological:      Mental Status: He is alert and oriented to person, place, and time. GCS: GCS eye subscore is 4. GCS verbal subscore is 5. GCS motor subscore is 6. Cranial Nerves: No cranial nerve deficit. Psychiatric:         Thought Content: Thought content normal.           Diagnostic Study Results     Labs -   No results found for this or any previous visit (from the past 12 hour(s)). Radiologic Studies -   No orders to display     CT Results  (Last 48 hours)    None        CXR Results  (Last 48 hours)    None            Medical Decision Making   I am the first provider for this patient. I reviewed the vital signs, available nursing notes, past medical history, past surgical history, family history and social history. Vital Signs-Reviewed the patient's vital signs. Records Reviewed: Nursing Notes and Old Medical Records  Provider Notes (Medical Decision Making):   77-year-old male with complaints of cold symptoms concerning for coronavirus exhibits nasal congestion and tonsillar erythema and swelling but otherwise unremarkable exam.  Discussed testing for strep and coronavirus however patient does not want to be swabbed at this time. Patient believes symptoms are more related to sinus changes.   Plan to treat with Claritin-D, guaifenesin, Afrin nasal spray. Advised that I will give antibiotic however she needs hold and not take unless symptoms do not improve in 5 to 7 days. Patient verbalized understanding. DDx: Covid, influenza, URI, sinusitis, viral vs strep pharyngitis            Disposition:  Discharge   DISCHARGE NOTE:         Care plan outlined and precautions discussed. Patient has no new complaints, changes, or physical findings. All of pt's questions and concerns were addressed. Patient was instructed and agrees to follow up with PCP, as well as to return to the ED upon further deterioration. Patient is ready to go home. Follow-up Information     Follow up With Specialties Details Why Contact Info    Radha Doe NP Nurse Practitioner In 1 week If symptoms worsen [address] [place] [address]  [phone]            Discharge Medication List as of 1/25/2021  3:26 PM      START taking these medications    Details   oxymetazoline (Afrin, oxymetazoline,) 0.05 % nasal spray 2 Sprays by Both Nostrils route two (2) times a day for 3 days. , Normal, Disp-1 Each, R-0      loratadine-pseudoephedrine (Claritin-D 12 Hour) 5-120 mg per tablet Take 1 Tab by mouth two (2) times a day., Normal, Disp-14 Tab, R-0      amoxicillin (AMOXIL) 875 mg tablet Take 1 Tab by mouth two (2) times a day for 7 days. , Normal, Disp-14 Tab, R-0      guaiFENesin ER (MUCINEX) 600 mg ER tablet Take 1 Tab by mouth two (2) times a day., Normal, Disp-14 Tab, R-0         CONTINUE these medications which have NOT CHANGED    Details   estradioL (ESTRACE) 2 mg tablet Take 1 tab twice a day--MUST ESTABLISH WITH NEW ENDOCRINOLOGIST OR CONTACT PCP FOR FURTHER REFILLS, Normal, Disp-60 Tab, R-5      spironolactone (ALDACTONE) 100 mg tablet take 1 tablet by mouth twice a day, Normal, Disp-60 Tab, R-10      clonazePAM (KLONOPIN) 0.5 mg tablet Historical Med, R-0      QUEtiapine (SEROQUEL) 100 mg tablet Historical Med, R-0         STOP taking these medications       loratadine (CLARITIN) 10 mg tablet Comments:   Reason for Stopping:                   Procedures:  Procedures    Please note that this dictation was completed with Dragon, computer voice recognition software. Quite often unanticipated grammatical, syntax, homophones, and other interpretive errors are inadvertently transcribed by the computer software. Please disregard these errors. Additionally, please excuse any errors that have escaped final proofreading. Diagnosis     Clinical Impression:   1.  Acute URI

## 2022-03-19 PROBLEM — E66.01 OBESITY, MORBID (HCC): Status: ACTIVE | Noted: 2017-12-18

## 2023-05-10 RX ORDER — GUAIFENESIN 600 MG/1
TABLET, EXTENDED RELEASE ORAL 2 TIMES DAILY
COMMUNITY
Start: 2021-01-25

## 2023-05-10 RX ORDER — SPIRONOLACTONE 100 MG/1
1 TABLET, FILM COATED ORAL 2 TIMES DAILY
COMMUNITY
Start: 2019-11-27

## 2023-05-10 RX ORDER — CLONAZEPAM 0.5 MG/1
TABLET ORAL
COMMUNITY
Start: 2015-08-13

## 2023-05-10 RX ORDER — QUETIAPINE FUMARATE 100 MG/1
TABLET, FILM COATED ORAL
COMMUNITY
Start: 2015-08-12

## 2023-05-10 RX ORDER — ESTRADIOL 2 MG/1
TABLET ORAL
COMMUNITY
Start: 2020-04-20

## 2023-05-10 RX ORDER — LORATADINE AND PSEUDOEPHEDRINE SULFATE 5; 120 MG/1; MG/1
1 TABLET, EXTENDED RELEASE ORAL 2 TIMES DAILY
COMMUNITY
Start: 2021-01-25